# Patient Record
Sex: MALE | Race: BLACK OR AFRICAN AMERICAN | NOT HISPANIC OR LATINO
[De-identification: names, ages, dates, MRNs, and addresses within clinical notes are randomized per-mention and may not be internally consistent; named-entity substitution may affect disease eponyms.]

---

## 2021-11-17 ENCOUNTER — NON-APPOINTMENT (OUTPATIENT)
Age: 61
End: 2021-11-17

## 2021-11-17 ENCOUNTER — APPOINTMENT (OUTPATIENT)
Dept: INTERNAL MEDICINE | Facility: CLINIC | Age: 61
End: 2021-11-17
Payer: COMMERCIAL

## 2021-11-17 ENCOUNTER — LABORATORY RESULT (OUTPATIENT)
Age: 61
End: 2021-11-17

## 2021-11-17 VITALS
SYSTOLIC BLOOD PRESSURE: 145 MMHG | DIASTOLIC BLOOD PRESSURE: 88 MMHG | WEIGHT: 194 LBS | HEART RATE: 80 BPM | TEMPERATURE: 98.3 F | RESPIRATION RATE: 14 BRPM | BODY MASS INDEX: 30.45 KG/M2 | HEIGHT: 67 IN | OXYGEN SATURATION: 98 %

## 2021-11-17 VITALS — DIASTOLIC BLOOD PRESSURE: 78 MMHG | SYSTOLIC BLOOD PRESSURE: 140 MMHG

## 2021-11-17 DIAGNOSIS — D49.59 NEOPLASM UNSPECIFIED BEHAVIOR OF OTHER GENITOURINARY ORGAN: ICD-10-CM

## 2021-11-17 DIAGNOSIS — Z23 ENCOUNTER FOR IMMUNIZATION: ICD-10-CM

## 2021-11-17 DIAGNOSIS — Z91.89 OTHER SPECIFIED PERSONAL RISK FACTORS, NOT ELSEWHERE CLASSIFIED: ICD-10-CM

## 2021-11-17 DIAGNOSIS — Z78.9 OTHER SPECIFIED HEALTH STATUS: ICD-10-CM

## 2021-11-17 PROCEDURE — 93000 ELECTROCARDIOGRAM COMPLETE: CPT

## 2021-11-17 PROCEDURE — 99386 PREV VISIT NEW AGE 40-64: CPT

## 2021-11-17 RX ORDER — LEUPROLIDE ACETATE 3.75 MG
3.75 KIT INTRAMUSCULAR
Refills: 0 | Status: ACTIVE | COMMUNITY
Start: 2021-11-17

## 2021-11-17 NOTE — ASSESSMENT
[FreeTextEntry1] : INITIAL CPE OF 61 Y OLD MALE WITH PMX OF HTN ,DYSLIPIDEMIA AND PROSTATE CA(BEEN SEEN AT [facility] [place] ON [facility]ON EVERY 3 M )= LABS ,EKG AND INFLUENZA VACCINE \par RTO 4 M \par REFER TO SEE OPHTHA\par RECOMM COVID-19 BOOSTER

## 2021-11-17 NOTE — HISTORY OF PRESENT ILLNESS
[de-identified] : PT COMES FOR INITIAL CPE \par HIS PMD RETIRED\par HAD COLONOSCOPY DR FRANCOIS 3 Y AGO \par HAD MRNA COVID-19 X2 DOSES

## 2021-11-17 NOTE — PHYSICAL EXAM

## 2021-11-17 NOTE — COUNSELING
[Benefits of weight loss discussed] : Benefits of weight loss discussed [Encouraged to increase physical activity] : Encouraged to increase physical activity No

## 2021-11-18 ENCOUNTER — MED ADMIN CHARGE (OUTPATIENT)
Age: 61
End: 2021-11-18

## 2021-11-18 LAB
25(OH)D3 SERPL-MCNC: 17.7 NG/ML
ALBUMIN SERPL ELPH-MCNC: 5 G/DL
ALP BLD-CCNC: 126 U/L
ALT SERPL-CCNC: 33 U/L
ANION GAP SERPL CALC-SCNC: 14 MMOL/L
APPEARANCE: ABNORMAL
AST SERPL-CCNC: 22 U/L
BILIRUB SERPL-MCNC: 0.2 MG/DL
BILIRUBIN URINE: NEGATIVE
BLOOD URINE: NEGATIVE
BUN SERPL-MCNC: 13 MG/DL
CALCIUM SERPL-MCNC: 9.9 MG/DL
CHLORIDE SERPL-SCNC: 101 MMOL/L
CHOLEST SERPL-MCNC: 359 MG/DL
CO2 SERPL-SCNC: 24 MMOL/L
COLOR: NORMAL
CREAT SERPL-MCNC: 0.82 MG/DL
CREAT SPEC-SCNC: 144 MG/DL
GLUCOSE QUALITATIVE U: ABNORMAL
GLUCOSE SERPL-MCNC: 176 MG/DL
HDLC SERPL-MCNC: 37 MG/DL
KETONES URINE: NEGATIVE
LDLC SERPL CALC-MCNC: NORMAL MG/DL
LEUKOCYTE ESTERASE URINE: NEGATIVE
MICROALBUMIN 24H UR DL<=1MG/L-MCNC: 4.1 MG/DL
MICROALBUMIN/CREAT 24H UR-RTO: 29 MG/G
NITRITE URINE: NEGATIVE
NONHDLC SERPL-MCNC: 322 MG/DL
PH URINE: 5.5
POTASSIUM SERPL-SCNC: 4.2 MMOL/L
PROT SERPL-MCNC: 7.3 G/DL
PROTEIN URINE: NORMAL
SODIUM SERPL-SCNC: 139 MMOL/L
SPECIFIC GRAVITY URINE: 1.02
TRIGL SERPL-MCNC: 534 MG/DL
TSH SERPL-ACNC: 1.98 UIU/ML
UROBILINOGEN URINE: NORMAL

## 2021-11-19 LAB
BASOPHILS # BLD AUTO: 0.02 K/UL
BASOPHILS NFR BLD AUTO: 0.4 %
EOSINOPHIL # BLD AUTO: 0.1 K/UL
EOSINOPHIL NFR BLD AUTO: 1.9 %
HCT VFR BLD CALC: 41.9 %
HGB BLD-MCNC: 13.7 G/DL
IMM GRANULOCYTES NFR BLD AUTO: 0.4 %
LYMPHOCYTES # BLD AUTO: 2.08 K/UL
LYMPHOCYTES NFR BLD AUTO: 40.3 %
MAN DIFF?: NORMAL
MCHC RBC-ENTMCNC: 29.1 PG
MCHC RBC-ENTMCNC: 32.7 GM/DL
MCV RBC AUTO: 89.1 FL
MONOCYTES # BLD AUTO: 0.54 K/UL
MONOCYTES NFR BLD AUTO: 10.5 %
NEUTROPHILS # BLD AUTO: 2.4 K/UL
NEUTROPHILS NFR BLD AUTO: 46.5 %
PLATELET # BLD AUTO: 238 K/UL
RBC # BLD: 4.7 M/UL
RBC # FLD: 14.6 %
WBC # FLD AUTO: 5.16 K/UL

## 2022-02-08 ENCOUNTER — RX RENEWAL (OUTPATIENT)
Age: 62
End: 2022-02-08

## 2022-04-05 ENCOUNTER — RX RENEWAL (OUTPATIENT)
Age: 62
End: 2022-04-05

## 2022-07-11 ENCOUNTER — RX RENEWAL (OUTPATIENT)
Age: 62
End: 2022-07-11

## 2022-08-06 ENCOUNTER — RX RENEWAL (OUTPATIENT)
Age: 62
End: 2022-08-06

## 2022-08-14 ENCOUNTER — RX RENEWAL (OUTPATIENT)
Age: 62
End: 2022-08-14

## 2022-08-17 ENCOUNTER — RX RENEWAL (OUTPATIENT)
Age: 62
End: 2022-08-17

## 2022-08-19 ENCOUNTER — RX RENEWAL (OUTPATIENT)
Age: 62
End: 2022-08-19

## 2022-08-26 ENCOUNTER — RX RENEWAL (OUTPATIENT)
Age: 62
End: 2022-08-26

## 2022-09-07 ENCOUNTER — RX RENEWAL (OUTPATIENT)
Age: 62
End: 2022-09-07

## 2022-09-19 ENCOUNTER — APPOINTMENT (OUTPATIENT)
Dept: INTERNAL MEDICINE | Facility: CLINIC | Age: 62
End: 2022-09-19

## 2022-09-19 VITALS
WEIGHT: 190 LBS | SYSTOLIC BLOOD PRESSURE: 142 MMHG | TEMPERATURE: 98 F | DIASTOLIC BLOOD PRESSURE: 80 MMHG | RESPIRATION RATE: 14 BRPM | BODY MASS INDEX: 29.82 KG/M2 | HEIGHT: 67 IN | HEART RATE: 81 BPM | OXYGEN SATURATION: 98 %

## 2022-09-19 VITALS — DIASTOLIC BLOOD PRESSURE: 80 MMHG | SYSTOLIC BLOOD PRESSURE: 138 MMHG

## 2022-09-19 PROCEDURE — 99214 OFFICE O/P EST MOD 30 MIN: CPT

## 2022-09-19 NOTE — HISTORY OF PRESENT ILLNESS
[de-identified] : COMES FOR F/U AFTER 10 M!!!\par OUT OF ATORVASTATIN AND ZETIA ; CC OF ED 5 Y AFTER PROSTATECTOMY ,ON LUPRON RX

## 2022-09-19 NOTE — ASSESSMENT
[FreeTextEntry1] : 61 Y OLD MALE WITH PMX OF HTN AND DYSLIPIDEMIA = LABS TODAY ,RX SENT \par IFG = HBA1C\par RTO CPE IN 2 M

## 2022-09-20 LAB
ALBUMIN SERPL ELPH-MCNC: 4.6 G/DL
ALP BLD-CCNC: 104 U/L
ALT SERPL-CCNC: 28 U/L
ANION GAP SERPL CALC-SCNC: 13 MMOL/L
AST SERPL-CCNC: 22 U/L
BILIRUB SERPL-MCNC: 0.2 MG/DL
BUN SERPL-MCNC: 15 MG/DL
CALCIUM SERPL-MCNC: 10.6 MG/DL
CHLORIDE SERPL-SCNC: 101 MMOL/L
CHOLEST SERPL-MCNC: 344 MG/DL
CO2 SERPL-SCNC: 26 MMOL/L
CREAT SERPL-MCNC: 0.81 MG/DL
EGFR: 99 ML/MIN/1.73M2
ESTIMATED AVERAGE GLUCOSE: 269 MG/DL
GLUCOSE SERPL-MCNC: 171 MG/DL
HBA1C MFR BLD HPLC: 11 %
HDLC SERPL-MCNC: 41 MG/DL
LDLC SERPL CALC-MCNC: 228 MG/DL
NONHDLC SERPL-MCNC: 304 MG/DL
POTASSIUM SERPL-SCNC: 4.5 MMOL/L
PROT SERPL-MCNC: 7.1 G/DL
SODIUM SERPL-SCNC: 140 MMOL/L
TRIGL SERPL-MCNC: 380 MG/DL

## 2022-09-28 ENCOUNTER — APPOINTMENT (OUTPATIENT)
Dept: INTERNAL MEDICINE | Facility: CLINIC | Age: 62
End: 2022-09-28
Payer: COMMERCIAL

## 2022-09-28 VITALS
DIASTOLIC BLOOD PRESSURE: 75 MMHG | SYSTOLIC BLOOD PRESSURE: 128 MMHG | WEIGHT: 190 LBS | HEART RATE: 85 BPM | BODY MASS INDEX: 29.82 KG/M2 | HEIGHT: 67 IN | RESPIRATION RATE: 14 BRPM | TEMPERATURE: 97.4 F | OXYGEN SATURATION: 99 %

## 2022-09-28 PROCEDURE — 99214 OFFICE O/P EST MOD 30 MIN: CPT

## 2022-09-28 PROCEDURE — 99401 PREV MED CNSL INDIV APPRX 15: CPT

## 2022-09-28 NOTE — ASSESSMENT
[FreeTextEntry1] : 61 Y OLD MALE WITH NEWLY DX DM = START METFORMIN 500 MG DAILY ,INCREASE TO BID 2ND WEEK AND TID 3ER WEEK \par RTO IN 6 WEEKS \par DISEASE PREVENTION COUNSELING PROVIDED FOR ADDITIONAL 15 MIN \par SEVERE DYSLIPIDEMIA = ATORVASTATIN RX \par HTN = CONTINUE CURRENT MEDS

## 2022-09-28 NOTE — PHYSICAL EXAM
[No Respiratory Distress] : no respiratory distress  [Normal Rate] : normal rate  [No Edema] : there was no peripheral edema [Soft] : abdomen soft [Normal] : affect was normal and insight and judgment were intact

## 2022-11-14 ENCOUNTER — RX RENEWAL (OUTPATIENT)
Age: 62
End: 2022-11-14

## 2022-11-21 ENCOUNTER — APPOINTMENT (OUTPATIENT)
Dept: INTERNAL MEDICINE | Facility: CLINIC | Age: 62
End: 2022-11-21

## 2022-11-21 ENCOUNTER — NON-APPOINTMENT (OUTPATIENT)
Age: 62
End: 2022-11-21

## 2022-11-21 VITALS
HEART RATE: 77 BPM | WEIGHT: 191 LBS | TEMPERATURE: 97.4 F | RESPIRATION RATE: 14 BRPM | BODY MASS INDEX: 29.98 KG/M2 | OXYGEN SATURATION: 98 % | SYSTOLIC BLOOD PRESSURE: 148 MMHG | HEIGHT: 67 IN | DIASTOLIC BLOOD PRESSURE: 85 MMHG

## 2022-11-21 PROCEDURE — 93000 ELECTROCARDIOGRAM COMPLETE: CPT

## 2022-11-21 PROCEDURE — 99396 PREV VISIT EST AGE 40-64: CPT

## 2022-11-21 NOTE — HISTORY OF PRESENT ILLNESS
[de-identified] : COMES FOR CPE \par TAKING METFORMIN 500 MG BID  AND ALL OTHER MEDS DAILY \par FEELS FINE \par

## 2022-11-21 NOTE — ASSESSMENT
[FreeTextEntry1] : CPE OF 62 Y OLD MALE WITH PMX OF HTN ,DM ,DYSLIPIDEMIA = LABS AND EKG \par RTO 3 M \par WE WILL INCREASE METFORMIN IF HBA1C NOT BELLOW 7.5\par RECOMM COVID AND INFLUENZA VACCINE

## 2022-11-22 LAB
25(OH)D3 SERPL-MCNC: 25 NG/ML
ALBUMIN SERPL ELPH-MCNC: 4.8 G/DL
ALP BLD-CCNC: 106 U/L
ALT SERPL-CCNC: 26 U/L
ANION GAP SERPL CALC-SCNC: 13 MMOL/L
APPEARANCE: CLEAR
AST SERPL-CCNC: 19 U/L
BASOPHILS # BLD AUTO: 0.03 K/UL
BASOPHILS NFR BLD AUTO: 0.5 %
BILIRUB SERPL-MCNC: 0.2 MG/DL
BILIRUBIN URINE: NEGATIVE
BLOOD URINE: NEGATIVE
BUN SERPL-MCNC: 15 MG/DL
CALCIUM SERPL-MCNC: 9.9 MG/DL
CHLORIDE SERPL-SCNC: 99 MMOL/L
CHOLEST SERPL-MCNC: 172 MG/DL
CO2 SERPL-SCNC: 25 MMOL/L
COLOR: NORMAL
CREAT SERPL-MCNC: 0.94 MG/DL
EGFR: 91 ML/MIN/1.73M2
EOSINOPHIL # BLD AUTO: 0.22 K/UL
EOSINOPHIL NFR BLD AUTO: 3.8 %
ESTIMATED AVERAGE GLUCOSE: 269 MG/DL
GLUCOSE QUALITATIVE U: ABNORMAL
GLUCOSE SERPL-MCNC: 284 MG/DL
HBA1C MFR BLD HPLC: 11 %
HCT VFR BLD CALC: 39 %
HDLC SERPL-MCNC: 38 MG/DL
HGB BLD-MCNC: 12.6 G/DL
IMM GRANULOCYTES NFR BLD AUTO: 0.2 %
KETONES URINE: NORMAL
LDLC SERPL CALC-MCNC: 73 MG/DL
LEUKOCYTE ESTERASE URINE: NEGATIVE
LYMPHOCYTES # BLD AUTO: 2.05 K/UL
LYMPHOCYTES NFR BLD AUTO: 35.7 %
MAN DIFF?: NORMAL
MCHC RBC-ENTMCNC: 28.6 PG
MCHC RBC-ENTMCNC: 32.3 GM/DL
MCV RBC AUTO: 88.4 FL
MONOCYTES # BLD AUTO: 0.47 K/UL
MONOCYTES NFR BLD AUTO: 8.2 %
NEUTROPHILS # BLD AUTO: 2.97 K/UL
NEUTROPHILS NFR BLD AUTO: 51.6 %
NITRITE URINE: NEGATIVE
NONHDLC SERPL-MCNC: 134 MG/DL
PH URINE: 6
PLATELET # BLD AUTO: 260 K/UL
POTASSIUM SERPL-SCNC: 4.1 MMOL/L
PROT SERPL-MCNC: 7.1 G/DL
PROTEIN URINE: NORMAL
RBC # BLD: 4.41 M/UL
RBC # FLD: 14 %
SODIUM SERPL-SCNC: 138 MMOL/L
SPECIFIC GRAVITY URINE: 1.04
TRIGL SERPL-MCNC: 305 MG/DL
TSH SERPL-ACNC: 1.94 UIU/ML
UROBILINOGEN URINE: NORMAL
WBC # FLD AUTO: 5.75 K/UL

## 2022-11-29 LAB
PSA FREE FLD-MCNC: NORMAL %
PSA FREE SERPL-MCNC: <0.01 NG/ML
PSA SERPL-MCNC: 0.52 NG/ML

## 2023-02-22 ENCOUNTER — APPOINTMENT (OUTPATIENT)
Dept: INTERNAL MEDICINE | Facility: CLINIC | Age: 63
End: 2023-02-22
Payer: COMMERCIAL

## 2023-02-22 VITALS
BODY MASS INDEX: 29.66 KG/M2 | HEART RATE: 80 BPM | DIASTOLIC BLOOD PRESSURE: 70 MMHG | TEMPERATURE: 97.7 F | SYSTOLIC BLOOD PRESSURE: 159 MMHG | WEIGHT: 189 LBS | HEIGHT: 67 IN | RESPIRATION RATE: 16 BRPM | OXYGEN SATURATION: 97 %

## 2023-02-22 LAB
CHOLEST SERPL-MCNC: 166 MG/DL
HDLC SERPL-MCNC: 41 MG/DL
LDLC SERPL CALC-MCNC: 82 MG/DL
NONHDLC SERPL-MCNC: 125 MG/DL
TRIGL SERPL-MCNC: 216 MG/DL

## 2023-02-22 PROCEDURE — 99214 OFFICE O/P EST MOD 30 MIN: CPT

## 2023-02-22 NOTE — PHYSICAL EXAM
[No Acute Distress] : no acute distress [Rounded] : rounded [Normal] : normal [Soft, Nontender] : the abdomen was soft and nontender [No Mass] : no masses were palpated [No HSM] : no hepatosplenomegaly noted [No Rash] : no rash [Coordination Grossly Intact] : coordination grossly intact [No Focal Deficits] : no focal deficits [Alert and Oriented x3] : oriented to person, place, and time

## 2023-02-22 NOTE — ASSESSMENT
[FreeTextEntry1] : 62 Y OLD MALE WITH PMX OF UNCONTROLLED DM = CGM RX ;LABS \par RTO 3 M \par MAY ADD FARXIGA AND OR RYBELSUS

## 2023-02-23 LAB
ESTIMATED AVERAGE GLUCOSE: 280 MG/DL
HBA1C MFR BLD HPLC: 11.4 %

## 2023-02-24 LAB
ALBUMIN SERPL ELPH-MCNC: 4.7 G/DL
ALP BLD-CCNC: 102 U/L
ALT SERPL-CCNC: 22 U/L
ANION GAP SERPL CALC-SCNC: 13 MMOL/L
AST SERPL-CCNC: 16 U/L
BILIRUB SERPL-MCNC: 0.3 MG/DL
BUN SERPL-MCNC: 11 MG/DL
CALCIUM SERPL-MCNC: 10.4 MG/DL
CHLORIDE SERPL-SCNC: 101 MMOL/L
CO2 SERPL-SCNC: 25 MMOL/L
CREAT SERPL-MCNC: 0.8 MG/DL
EGFR: 99 ML/MIN/1.73M2
GLUCOSE SERPL-MCNC: 277 MG/DL
POTASSIUM SERPL-SCNC: 4.9 MMOL/L
PROT SERPL-MCNC: 7.1 G/DL
SODIUM SERPL-SCNC: 139 MMOL/L

## 2023-03-02 ENCOUNTER — RX CHANGE (OUTPATIENT)
Age: 63
End: 2023-03-02

## 2023-03-03 ENCOUNTER — RX CHANGE (OUTPATIENT)
Age: 63
End: 2023-03-03

## 2023-05-24 ENCOUNTER — APPOINTMENT (OUTPATIENT)
Dept: INTERNAL MEDICINE | Facility: CLINIC | Age: 63
End: 2023-05-24
Payer: COMMERCIAL

## 2023-05-24 VITALS
BODY MASS INDEX: 29.35 KG/M2 | SYSTOLIC BLOOD PRESSURE: 154 MMHG | HEIGHT: 67 IN | OXYGEN SATURATION: 98 % | HEART RATE: 76 BPM | WEIGHT: 187 LBS | TEMPERATURE: 98.2 F | RESPIRATION RATE: 15 BRPM | DIASTOLIC BLOOD PRESSURE: 83 MMHG

## 2023-05-24 LAB
ALBUMIN SERPL ELPH-MCNC: 4.9 G/DL
ALP BLD-CCNC: 107 U/L
ALT SERPL-CCNC: 26 U/L
ANION GAP SERPL CALC-SCNC: 16 MMOL/L
AST SERPL-CCNC: 20 U/L
BILIRUB SERPL-MCNC: 0.4 MG/DL
BUN SERPL-MCNC: 14 MG/DL
CALCIUM SERPL-MCNC: 10.2 MG/DL
CHLORIDE SERPL-SCNC: 102 MMOL/L
CHOLEST SERPL-MCNC: 184 MG/DL
CO2 SERPL-SCNC: 24 MMOL/L
CREAT SERPL-MCNC: 0.82 MG/DL
EGFR: 98 ML/MIN/1.73M2
GLUCOSE SERPL-MCNC: 186 MG/DL
HDLC SERPL-MCNC: 43 MG/DL
LDLC SERPL CALC-MCNC: 100 MG/DL
NONHDLC SERPL-MCNC: 141 MG/DL
POTASSIUM SERPL-SCNC: 4.8 MMOL/L
PROT SERPL-MCNC: 6.9 G/DL
SODIUM SERPL-SCNC: 141 MMOL/L
TRIGL SERPL-MCNC: 206 MG/DL

## 2023-05-24 PROCEDURE — 99214 OFFICE O/P EST MOD 30 MIN: CPT

## 2023-05-24 RX ORDER — FLASH GLUCOSE SCANNING READER
EACH MISCELLANEOUS
Qty: 2 | Refills: 5 | Status: DISCONTINUED | COMMUNITY
Start: 2023-02-22 | End: 2023-05-24

## 2023-05-24 RX ORDER — BLOOD-GLUCOSE METER
W/DEVICE KIT MISCELLANEOUS
Qty: 1 | Refills: 0 | Status: ACTIVE | COMMUNITY
Start: 2023-05-24 | End: 1900-01-01

## 2023-05-24 NOTE — PHYSICAL EXAM
[No Acute Distress] : no acute distress [Normal] : soft, non-tender, non-distended, no masses palpated, no HSM and normal bowel sounds [Coordination Grossly Intact] : coordination grossly intact [No Focal Deficits] : no focal deficits [Alert and Oriented x3] : oriented to person, place, and time

## 2023-05-24 NOTE — HISTORY OF PRESENT ILLNESS
[de-identified] : COMES FOR F/U \par NEVER SAW ENDO AS RECOMM \par USED TO TAKE JARDIANCE YEARS AGO \par COMPLIANT WITH METFORMIN 500 TID \par WILL HAVE PET SCAN NEXT WEEK BY SARAVANAN

## 2023-05-24 NOTE — ASSESSMENT
[FreeTextEntry1] : 62 Y OLD MALE WITH PMX OF PROSTATE CA= F/U  ,ON LUPRON \par POORLY CONTROL DM AND NOT WILLING TO USE INSULIN OR ANY INJ,RESUME JARDIANCE AND INCREASE METFORMIN TO 2 GM A DAY FROM 1500 MG \par RTO 2-3 M \par DECLINED CGM;GLUCOMETER RX SENT

## 2023-05-25 LAB
ESTIMATED AVERAGE GLUCOSE: 280 MG/DL
HBA1C MFR BLD HPLC: 11.4 %

## 2023-05-26 LAB — FRUCTOSAMINE SERPL-MCNC: 448 UMOL/L

## 2023-06-06 RX ORDER — LANCETS 33 GAUGE
EACH MISCELLANEOUS
Qty: 1 | Refills: 1 | Status: ACTIVE | COMMUNITY
Start: 2023-06-06 | End: 1900-01-01

## 2023-06-06 RX ORDER — ISOPROPYL ALCOHOL 0.7 ML/ML
SWAB TOPICAL
Qty: 1 | Refills: 1 | Status: ACTIVE | COMMUNITY
Start: 2023-06-06 | End: 1900-01-01

## 2023-06-26 ENCOUNTER — APPOINTMENT (OUTPATIENT)
Dept: INTERNAL MEDICINE | Facility: CLINIC | Age: 63
End: 2023-06-26
Payer: COMMERCIAL

## 2023-06-26 VITALS
RESPIRATION RATE: 15 BRPM | HEIGHT: 67 IN | OXYGEN SATURATION: 98 % | WEIGHT: 183 LBS | BODY MASS INDEX: 28.72 KG/M2 | TEMPERATURE: 98.3 F | SYSTOLIC BLOOD PRESSURE: 120 MMHG | DIASTOLIC BLOOD PRESSURE: 80 MMHG | HEART RATE: 77 BPM

## 2023-06-26 PROCEDURE — 99215 OFFICE O/P EST HI 40 MIN: CPT

## 2023-06-26 NOTE — ASSESSMENT
[Continue medications as is] : Continue current medications [As per surgery] : as per surgery [FreeTextEntry4] : 62 Y OLD MALE WITH PMX OF DM ,HTN AND DYSLIPIDEMIA AT MODERATE RISK FOR DENTAL SURGERY WITH NO ABSOLUTE CONTRAINDICATION

## 2023-06-26 NOTE — PHYSICAL EXAM
[No Acute Distress] : no acute distress [Normal Outer Ear/Nose] : the outer ears and nose were normal in appearance [No JVD] : no jugular venous distention [Normal] : soft, non-tender, non-distended, no masses palpated, no HSM and normal bowel sounds [No Rash] : no rash [Coordination Grossly Intact] : coordination grossly intact [Alert and Oriented x3] : oriented to person, place, and time

## 2023-06-26 NOTE — HISTORY OF PRESENT ILLNESS
[Diabetes] : diabetes [FreeTextEntry1] : DENTAL SURGERY [FreeTextEntry4] : PT WILL UNDERGO DENTAL SURGERY SOON BUT NEEDS PREOP EVAL

## 2023-07-03 LAB — FRUCTOSAMINE SERPL-MCNC: 390 UMOL/L

## 2023-08-10 ENCOUNTER — RX RENEWAL (OUTPATIENT)
Age: 63
End: 2023-08-10

## 2023-08-23 ENCOUNTER — APPOINTMENT (OUTPATIENT)
Dept: INTERNAL MEDICINE | Facility: CLINIC | Age: 63
End: 2023-08-23

## 2023-08-30 ENCOUNTER — APPOINTMENT (OUTPATIENT)
Dept: INTERNAL MEDICINE | Facility: CLINIC | Age: 63
End: 2023-08-30
Payer: COMMERCIAL

## 2023-08-30 ENCOUNTER — NON-APPOINTMENT (OUTPATIENT)
Age: 63
End: 2023-08-30

## 2023-08-30 VITALS
OXYGEN SATURATION: 99 % | TEMPERATURE: 97.9 F | BODY MASS INDEX: 28.41 KG/M2 | HEART RATE: 66 BPM | HEIGHT: 67 IN | WEIGHT: 181 LBS | RESPIRATION RATE: 15 BRPM | DIASTOLIC BLOOD PRESSURE: 93 MMHG | SYSTOLIC BLOOD PRESSURE: 172 MMHG

## 2023-08-30 DIAGNOSIS — Z01.818 ENCOUNTER FOR OTHER PREPROCEDURAL EXAMINATION: ICD-10-CM

## 2023-08-30 LAB
ALBUMIN SERPL ELPH-MCNC: 4.9 G/DL
ALP BLD-CCNC: 119 U/L
ALT SERPL-CCNC: 20 U/L
ANION GAP SERPL CALC-SCNC: 17 MMOL/L
AST SERPL-CCNC: 16 U/L
BILIRUB SERPL-MCNC: 0.4 MG/DL
BUN SERPL-MCNC: 10 MG/DL
CALCIUM SERPL-MCNC: 10.3 MG/DL
CHLORIDE SERPL-SCNC: 103 MMOL/L
CHOLEST SERPL-MCNC: 248 MG/DL
CO2 SERPL-SCNC: 25 MMOL/L
CREAT SERPL-MCNC: 0.81 MG/DL
EGFR: 98 ML/MIN/1.73M2
FRUCTOSAMINE SERPL-MCNC: 376 UMOL/L
GLUCOSE SERPL-MCNC: 127 MG/DL
HCT VFR BLD CALC: 44.4 %
HDLC SERPL-MCNC: 49 MG/DL
HGB BLD-MCNC: 14.3 G/DL
LDLC SERPL CALC-MCNC: 156 MG/DL
MCHC RBC-ENTMCNC: 28 PG
MCHC RBC-ENTMCNC: 32.2 GM/DL
MCV RBC AUTO: 87.1 FL
NONHDLC SERPL-MCNC: 199 MG/DL
PLATELET # BLD AUTO: 218 K/UL
POTASSIUM SERPL-SCNC: 5 MMOL/L
PROT SERPL-MCNC: 7.2 G/DL
RBC # BLD: 5.1 M/UL
RBC # FLD: 14.1 %
SODIUM SERPL-SCNC: 145 MMOL/L
TRIGL SERPL-MCNC: 234 MG/DL
WBC # FLD AUTO: 4.31 K/UL

## 2023-08-30 PROCEDURE — 93000 ELECTROCARDIOGRAM COMPLETE: CPT | Mod: NC

## 2023-08-30 PROCEDURE — 99215 OFFICE O/P EST HI 40 MIN: CPT | Mod: 25

## 2023-08-30 RX ORDER — ENZALUTAMIDE 40 MG/1
40 CAPSULE ORAL
Refills: 0 | Status: ACTIVE | COMMUNITY
Start: 2023-08-30

## 2023-08-30 NOTE — HISTORY OF PRESENT ILLNESS
[No Pertinent Cardiac History] : no history of aortic stenosis, atrial fibrillation, coronary artery disease, recent myocardial infarction, or implantable device/pacemaker [No Pertinent Pulmonary History] : no history of asthma, COPD, sleep apnea, or smoking [Diabetes] : diabetes [FreeTextEntry1] : PERIODONTAL SURGERY [FreeTextEntry4] : PT WILL HAVE PERIODONTAL SURGERY  SURGEON ASKING TO STOP AMLODIPINE DUE TO GUM DISEASE

## 2023-08-30 NOTE — PHYSICAL EXAM
[No Acute Distress] : no acute distress [Normal Sclera/Conjunctiva] : normal sclera/conjunctiva [Normal Outer Ear/Nose] : the outer ears and nose were normal in appearance [Rounded] : rounded [Normal] : normal [Soft, Nontender] : the abdomen was soft and nontender [No Mass] : no masses were palpated [No HSM] : no hepatosplenomegaly noted [No CVA Tenderness] : no CVA  tenderness [No Joint Swelling] : no joint swelling [No Rash] : no rash [Coordination Grossly Intact] : coordination grossly intact [No Focal Deficits] : no focal deficits [Alert and Oriented x3] : oriented to person, place, and time

## 2023-08-30 NOTE — ASSESSMENT
[Patient Optimized for Surgery] : Patient optimized for surgery [No Further Testing Recommended] : no further testing recommended [Continue medications as is] : Continue current medications [As per surgery] : as per surgery [FreeTextEntry4] : 62 Y OLD MALE WITH PMX OF HTN ,DYSLIPIDEMIA AND DM AT ACCEPTABLE RISK FOR SURGERY  AMLODIPINE D/C AND METOPROLOL 50 MG ADDED AND OLMESARTAN 40/12.5 CHANGE TO 40/25

## 2023-08-31 LAB
APTT BLD: 32.9 SEC
INR PPP: 0.86 RATIO
PT BLD: 9.8 SEC

## 2023-09-06 LAB
ESTIMATED AVERAGE GLUCOSE: 212 MG/DL
HBA1C MFR BLD HPLC: 9 %

## 2023-09-18 ENCOUNTER — RX RENEWAL (OUTPATIENT)
Age: 63
End: 2023-09-18

## 2023-10-01 ENCOUNTER — RX RENEWAL (OUTPATIENT)
Age: 63
End: 2023-10-01

## 2023-10-17 ENCOUNTER — RX RENEWAL (OUTPATIENT)
Age: 63
End: 2023-10-17

## 2023-11-17 RX ORDER — METOPROLOL SUCCINATE 50 MG/1
50 TABLET, EXTENDED RELEASE ORAL DAILY
Refills: 0 | Status: ACTIVE | COMMUNITY
Start: 2023-08-30

## 2023-11-20 ENCOUNTER — APPOINTMENT (OUTPATIENT)
Dept: HEART AND VASCULAR | Facility: CLINIC | Age: 63
End: 2023-11-20
Payer: COMMERCIAL

## 2023-11-20 VITALS
HEIGHT: 67 IN | WEIGHT: 181 LBS | HEART RATE: 64 BPM | TEMPERATURE: 98 F | OXYGEN SATURATION: 99 % | DIASTOLIC BLOOD PRESSURE: 93 MMHG | SYSTOLIC BLOOD PRESSURE: 169 MMHG | RESPIRATION RATE: 15 BRPM | BODY MASS INDEX: 28.41 KG/M2

## 2023-11-20 PROCEDURE — 99205 OFFICE O/P NEW HI 60 MIN: CPT | Mod: 25

## 2023-11-20 PROCEDURE — 93000 ELECTROCARDIOGRAM COMPLETE: CPT

## 2023-11-20 RX ORDER — METFORMIN HYDROCHLORIDE 500 MG/1
500 TABLET, COATED ORAL 4 TIMES DAILY
Qty: 360 | Refills: 1 | Status: DISCONTINUED | COMMUNITY
Start: 2022-09-28 | End: 2023-11-20

## 2023-11-20 RX ORDER — AMLODIPINE BESYLATE 10 MG/1
10 TABLET ORAL
Qty: 90 | Refills: 0 | Status: DISCONTINUED | COMMUNITY
Start: 2022-02-08 | End: 2023-11-20

## 2023-11-20 RX ORDER — ATORVASTATIN CALCIUM 80 MG/1
80 TABLET, FILM COATED ORAL
Qty: 90 | Refills: 3 | Status: ACTIVE | COMMUNITY
Start: 2022-02-08 | End: 1900-01-01

## 2023-11-22 ENCOUNTER — APPOINTMENT (OUTPATIENT)
Dept: INTERNAL MEDICINE | Facility: CLINIC | Age: 63
End: 2023-11-22
Payer: COMMERCIAL

## 2023-11-22 VITALS
SYSTOLIC BLOOD PRESSURE: 147 MMHG | WEIGHT: 181 LBS | HEIGHT: 67 IN | BODY MASS INDEX: 28.41 KG/M2 | DIASTOLIC BLOOD PRESSURE: 83 MMHG | HEART RATE: 64 BPM | RESPIRATION RATE: 15 BRPM | OXYGEN SATURATION: 97 % | TEMPERATURE: 97.8 F

## 2023-11-22 PROCEDURE — 99397 PER PM REEVAL EST PAT 65+ YR: CPT

## 2023-11-22 RX ORDER — OLMESARTAN MEDOXOMIL AND HYDROCHLOROTHIAZIDE 40; 25 MG/1; MG/1
40-25 TABLET ORAL DAILY
Qty: 90 | Refills: 0 | Status: DISCONTINUED | COMMUNITY
Start: 2022-04-05 | End: 2023-11-22

## 2023-11-25 LAB
25(OH)D3 SERPL-MCNC: 36.9 NG/ML
ALBUMIN SERPL ELPH-MCNC: 4.9 G/DL
ALP BLD-CCNC: 131 U/L
ALT SERPL-CCNC: 20 U/L
ANION GAP SERPL CALC-SCNC: 18 MMOL/L
APPEARANCE: CLEAR
AST SERPL-CCNC: 19 U/L
BILIRUB SERPL-MCNC: 0.4 MG/DL
BILIRUBIN URINE: NEGATIVE
BLOOD URINE: NEGATIVE
BUN SERPL-MCNC: 13 MG/DL
CALCIUM SERPL-MCNC: 10.3 MG/DL
CHLORIDE SERPL-SCNC: 101 MMOL/L
CHOLEST SERPL-MCNC: 159 MG/DL
CO2 SERPL-SCNC: 23 MMOL/L
COLOR: YELLOW
CREAT SERPL-MCNC: 0.85 MG/DL
CREAT SPEC-SCNC: 30 MG/DL
EGFR: 96 ML/MIN/1.73M2
ESTIMATED AVERAGE GLUCOSE: 206 MG/DL
GLUCOSE QUALITATIVE U: >=1000 MG/DL
GLUCOSE SERPL-MCNC: 160 MG/DL
HBA1C MFR BLD HPLC: 8.8 %
HDLC SERPL-MCNC: 48 MG/DL
KETONES URINE: NEGATIVE MG/DL
LDLC SERPL CALC-MCNC: 86 MG/DL
LEUKOCYTE ESTERASE URINE: NEGATIVE
MICROALBUMIN 24H UR DL<=1MG/L-MCNC: <1.2 MG/DL
MICROALBUMIN/CREAT 24H UR-RTO: NORMAL MG/G
NITRITE URINE: NEGATIVE
NONHDLC SERPL-MCNC: 110 MG/DL
PH URINE: 6
POTASSIUM SERPL-SCNC: 3.9 MMOL/L
PROT SERPL-MCNC: 7.3 G/DL
PROTEIN URINE: NEGATIVE MG/DL
PSA FREE FLD-MCNC: NORMAL %
PSA FREE SERPL-MCNC: <0.01 NG/ML
PSA SERPL-MCNC: 0.02 NG/ML
SODIUM SERPL-SCNC: 143 MMOL/L
SPECIFIC GRAVITY URINE: 1.02
TRIGL SERPL-MCNC: 140 MG/DL
TSH SERPL-ACNC: 2.12 UIU/ML
UROBILINOGEN URINE: 0.2 MG/DL
VIT B12 SERPL-MCNC: 908 PG/ML

## 2023-12-06 LAB
HCT VFR BLD CALC: 47.9 %
HGB BLD-MCNC: 15.4 G/DL
MCHC RBC-ENTMCNC: 28 PG
MCHC RBC-ENTMCNC: 32.2 GM/DL
MCV RBC AUTO: 87.1 FL
PLATELET # BLD AUTO: 200 K/UL
RBC # BLD: 5.5 M/UL
RBC # FLD: 15.3 %
WBC # FLD AUTO: 5.68 K/UL

## 2023-12-16 ENCOUNTER — RX RENEWAL (OUTPATIENT)
Age: 63
End: 2023-12-16

## 2023-12-26 ENCOUNTER — RX RENEWAL (OUTPATIENT)
Age: 63
End: 2023-12-26

## 2024-01-19 ENCOUNTER — NON-APPOINTMENT (OUTPATIENT)
Age: 64
End: 2024-01-19

## 2024-01-26 ENCOUNTER — OUTPATIENT (OUTPATIENT)
Dept: OUTPATIENT SERVICES | Facility: HOSPITAL | Age: 64
LOS: 1 days | End: 2024-01-26
Payer: COMMERCIAL

## 2024-01-26 ENCOUNTER — NON-APPOINTMENT (OUTPATIENT)
Age: 64
End: 2024-01-26

## 2024-01-26 ENCOUNTER — RESULT REVIEW (OUTPATIENT)
Age: 64
End: 2024-01-26

## 2024-01-26 DIAGNOSIS — R06.00 DYSPNEA, UNSPECIFIED: ICD-10-CM

## 2024-01-26 PROCEDURE — 93017 CV STRESS TEST TRACING ONLY: CPT

## 2024-01-26 PROCEDURE — 93306 TTE W/DOPPLER COMPLETE: CPT

## 2024-01-26 PROCEDURE — 93018 CV STRESS TEST I&R ONLY: CPT

## 2024-01-26 PROCEDURE — 93016 CV STRESS TEST SUPVJ ONLY: CPT

## 2024-01-26 PROCEDURE — 78452 HT MUSCLE IMAGE SPECT MULT: CPT

## 2024-01-26 PROCEDURE — 93306 TTE W/DOPPLER COMPLETE: CPT | Mod: 26

## 2024-01-26 PROCEDURE — A9500: CPT

## 2024-01-26 PROCEDURE — 78452 HT MUSCLE IMAGE SPECT MULT: CPT | Mod: 26

## 2024-02-05 ENCOUNTER — APPOINTMENT (OUTPATIENT)
Dept: HEART AND VASCULAR | Facility: CLINIC | Age: 64
End: 2024-02-05
Payer: COMMERCIAL

## 2024-02-05 VITALS
DIASTOLIC BLOOD PRESSURE: 84 MMHG | WEIGHT: 179 LBS | HEIGHT: 67 IN | SYSTOLIC BLOOD PRESSURE: 165 MMHG | TEMPERATURE: 98 F | OXYGEN SATURATION: 98 % | BODY MASS INDEX: 28.09 KG/M2 | RESPIRATION RATE: 15 BRPM | HEART RATE: 65 BPM

## 2024-02-05 DIAGNOSIS — R06.00 DYSPNEA, UNSPECIFIED: ICD-10-CM

## 2024-02-05 PROCEDURE — 99215 OFFICE O/P EST HI 40 MIN: CPT

## 2024-02-05 PROCEDURE — G2211 COMPLEX E/M VISIT ADD ON: CPT | Mod: NC,1L

## 2024-02-05 RX ORDER — HYDROCHLOROTHIAZIDE 50 MG/1
50 TABLET ORAL
Qty: 30 | Refills: 2 | Status: DISCONTINUED | COMMUNITY
Start: 2023-11-20 | End: 2024-02-05

## 2024-02-05 RX ORDER — VALSARTAN 160 MG/1
160 TABLET, COATED ORAL
Qty: 30 | Refills: 2 | Status: DISCONTINUED | COMMUNITY
Start: 2023-11-20 | End: 2024-02-05

## 2024-03-04 PROBLEM — R06.00 DYSPNEA: Status: ACTIVE | Noted: 2023-11-20

## 2024-03-04 NOTE — CARDIOLOGY SUMMARY
[de-identified] : 11/20/23: Sinus bradycardia at 54bpm, LVH, NSSTTC [de-identified] : NST 1/26/24: Myocardial perfusion imaging is normal, there is no evidence of inducible ischemia, no segmental wall motion maladies or evidence of ischemic dilation of the LV, ECG stress test positive for ischemic changes. [de-identified] : 1/26/24: Normal left ventricular size and systolic function, mild symmetric LVH, normal right ventricular size and systolic function, no significant valvular disease, no evidence of pulmonary hypertension, no pericardial effusion.

## 2024-03-04 NOTE — HISTORY OF PRESENT ILLNESS
[FreeTextEntry1] : 63-year-old man, with a past medical history of hypertension, hyperlipidemia, and diabetes, presenting for fu of HTN. Patient states that he intermittently experiences WHITE. He is able to ambulate >10 blocks and >2 flights of stairs w/o inhibition by CP or WHITE, but extended periods of activity sometimes entail fatigue that is new within the past year. Given this, he underwent a NST (details below) that demonstrated normal MPI, but ECG stress test was positive for ischemic changes. He denies CP, palpitations, dizziness/LOC, PND, orthopnea, HAs, abdominal pain, and LE swelling. Otherwise, he endorses that he is adherent to his medications as prescribed; though he consumes mostly animal proteins and performs limited exercise.

## 2024-03-04 NOTE — ASSESSMENT
[FreeTextEntry1] : 63-year-old man, with a past medical history of hypertension, hyperlipidemia, and diabetes, presenting for fu of HTN.   WHITE: Patient states that he intermittently experiences WHITE. He is able to ambulate >10 blocks and >2 flights of stairs w/o inhibition by CP or WHITE, but extended periods of activity sometimes entail fatigue that is new within the past year. Given this, he underwent a NST (details below) that demonstrated normal MPI, but ECG stress test was positive for ischemic changes. He denies CP, palpitations, dizziness/LOC, PND, orthopnea, HAs, abdominal pain, and LE swelling.  - LHC advised given positive ECG stress, though patient deferring at this time - f/u sx at ensuing visit  HTN: BP uncontrolled at this time on Toprol 50mg and olmesartan-HCTZ 40-25mg QDay. He previously took amlodipine (10mg) but stopped it due to gum swelling. Otherwise, he endorses that he is adherent to his medications as prescribed. - restart amlodipine 5mg Po QDay as patient previously had controlled BPs with this - start valsartan -[id number] PO QDay - f/u BP at ensuing visits  DM: HgA1c 9/6/23 9.0% - diabetic regimen managed by [email or web address]  HLD: Latest lipid profile on August 30, 2023 with total cholesterol 248, , HDL 49, triglycerides 234. - continue atorvastatin 80mg PO QHS - continue ezetimibe 10mg PO QDay  F/u in 1 month.

## 2024-04-08 ENCOUNTER — NON-APPOINTMENT (OUTPATIENT)
Age: 64
End: 2024-04-08

## 2024-04-08 ENCOUNTER — APPOINTMENT (OUTPATIENT)
Dept: HEART AND VASCULAR | Facility: CLINIC | Age: 64
End: 2024-04-08
Payer: COMMERCIAL

## 2024-04-08 VITALS
HEIGHT: 67 IN | BODY MASS INDEX: 28.41 KG/M2 | DIASTOLIC BLOOD PRESSURE: 94 MMHG | HEART RATE: 64 BPM | OXYGEN SATURATION: 99 % | WEIGHT: 181 LBS | RESPIRATION RATE: 15 BRPM | SYSTOLIC BLOOD PRESSURE: 189 MMHG

## 2024-04-08 DIAGNOSIS — Z00.00 ENCOUNTER FOR GENERAL ADULT MEDICAL EXAMINATION W/OUT ABNORMAL FINDINGS: ICD-10-CM

## 2024-04-08 PROCEDURE — 99215 OFFICE O/P EST HI 40 MIN: CPT | Mod: 25

## 2024-04-08 PROCEDURE — G2211 COMPLEX E/M VISIT ADD ON: CPT | Mod: NC,1L

## 2024-04-08 PROCEDURE — 93000 ELECTROCARDIOGRAM COMPLETE: CPT

## 2024-04-08 RX ORDER — AMLODIPINE BESYLATE 10 MG/1
10 TABLET ORAL
Qty: 90 | Refills: 1 | Status: ACTIVE | COMMUNITY
Start: 2024-02-05 | End: 1900-01-01

## 2024-04-08 NOTE — ASSESSMENT
[FreeTextEntry1] : 63-year-old man, with a past medical history of hypertension, hyperlipidemia, and diabetes, presenting with chest discomfort.   CP: Patient states that he was moving boxes for work last Wednesday (5 days ago) and has been experiencing a left-sided chest discomfort since then. This feels like a "stretching" pain that is exacerbated by moving his arms laterally. He is able to ambulate >10 blocks and >2 flights of stairs without inhibition by chest pain or dyspnea on exertion. In January of this year, he underwent a NST (details below) that demonstrated normal MPI, but ECG stress test was positive for ischemic changes. He denies palpitations, dizziness/LOC, PND, orthopnea, HAs, abdominal pain, and LE swelling. Otherwise, he endorses that he is adherent to his medications as prescribed; though he consumes mostly animal proteins and performs limited exercise. Physical exam today is unremarkable. ECG unchanged from prior. Given quality of pain, likely musculoskeletal in origin. - LHC previously advised given positive ECG stress, though patient deferring - f/u sx at ensuing visit  HTN: BP elevated at this time. - increase amlodipine to 10mg PO QDay as patient previously had controlled BPs with this - continue valsartan -[id number] PO QDay - f/u BP at ensuing visits  DM: HgA1c 9/6/23 9.0% - diabetic regimen managed by PMD  HLD: Latest lipid profile on August 30, 2023 with total cholesterol 248, , HDL 49, triglycerides 234. - continue atorvastatin 80mg PO QHS - continue ezetimibe 10mg PO QDay  F/u in 1 month.

## 2024-04-08 NOTE — HISTORY OF PRESENT ILLNESS
[FreeTextEntry1] : 63-year-old man, with a past medical history of hypertension, hyperlipidemia, and diabetes, presenting with chest discomfort. Patient states that he was moving boxes for work last Wednesday (5 days ago) and has been experiencing a left-sided chest discomfort since then. This feels like a "stretching" pain that is exacerbated by moving his arms laterally. He is able to ambulate >10 blocks and >2 flights of stairs without inhibition by chest pain or dyspnea on exertion. In January of this year, he underwent a NST (details below) that demonstrated normal MPI, but ECG stress test was positive for ischemic changes. He denies palpitations, dizziness/LOC, PND, orthopnea, HAs, abdominal pain, and LE swelling. Otherwise, he endorses that he is adherent to his medications as prescribed; though he consumes mostly animal proteins and performs limited exercise.

## 2024-04-08 NOTE — CARDIOLOGY SUMMARY
[de-identified] : 4/8/24: Sinus bradycardia at 58 bpm, LVH, NSSTTC  11/20/23: Sinus bradycardia at 54bpm, LVH, NSSTTC   [de-identified] : NST 1/26/24: Myocardial perfusion imaging is normal, there is no evidence of inducible ischemia, no segmental wall motion maladies or evidence of ischemic dilation of the LV, ECG stress test positive for ischemic changes.   [de-identified] : 1/26/24: Normal left ventricular size and systolic function, mild symmetric LVH, normal right ventricular size and systolic function, no significant valvular disease, no evidence of pulmonary hypertension, no pericardial effusion.

## 2024-05-01 ENCOUNTER — APPOINTMENT (OUTPATIENT)
Dept: INTERNAL MEDICINE | Facility: CLINIC | Age: 64
End: 2024-05-01
Payer: COMMERCIAL

## 2024-05-01 VITALS — SYSTOLIC BLOOD PRESSURE: 148 MMHG | DIASTOLIC BLOOD PRESSURE: 80 MMHG

## 2024-05-01 VITALS
SYSTOLIC BLOOD PRESSURE: 181 MMHG | BODY MASS INDEX: 28.09 KG/M2 | DIASTOLIC BLOOD PRESSURE: 78 MMHG | OXYGEN SATURATION: 99 % | RESPIRATION RATE: 14 BRPM | WEIGHT: 179 LBS | TEMPERATURE: 95.2 F | HEIGHT: 67 IN | HEART RATE: 67 BPM

## 2024-05-01 PROCEDURE — 99214 OFFICE O/P EST MOD 30 MIN: CPT

## 2024-05-01 RX ORDER — REPAGLINIDE 0.5 MG/1
0.5 TABLET ORAL
Qty: 270 | Refills: 0 | Status: ACTIVE | COMMUNITY
Start: 2023-08-30 | End: 1900-01-01

## 2024-05-01 RX ORDER — EZETIMIBE 10 MG/1
10 TABLET ORAL
Qty: 90 | Refills: 1 | Status: ACTIVE | COMMUNITY
Start: 2022-04-05 | End: 1900-01-01

## 2024-05-01 RX ORDER — BLOOD-GLUCOSE METER
KIT MISCELLANEOUS
Qty: 1 | Refills: 0 | Status: ACTIVE | COMMUNITY
Start: 2024-05-01 | End: 1900-01-01

## 2024-05-01 NOTE — HISTORY OF PRESENT ILLNESS
[de-identified] : COMES FOR F/U AFTRE 6 M ;BSFS  ;TAKING REPAGLINIDE ONCE DAILY ONLY  LAST WEEKEND HIS SON GOT  AND HIS MOTHER TURNED 89 Y OLD= EXCESSIVE EATING FOR 2 DAYS

## 2024-05-01 NOTE — ASSESSMENT
[FreeTextEntry1] : 63 Y OLD MALE WITH PMX OF HTN ,DM ,DYSLIPIDEMIA = LABS AND MEDS ORDERED RTO 3 M  F/U CARDIO ;WILL GET NEW BP MONITORING DEVICE

## 2024-05-02 LAB
ALBUMIN SERPL ELPH-MCNC: 4.8 G/DL
ALP BLD-CCNC: 114 U/L
ALT SERPL-CCNC: 17 U/L
ANION GAP SERPL CALC-SCNC: 14 MMOL/L
AST SERPL-CCNC: 15 U/L
BILIRUB SERPL-MCNC: 0.4 MG/DL
BUN SERPL-MCNC: 12 MG/DL
CALCIUM SERPL-MCNC: 10 MG/DL
CHLORIDE SERPL-SCNC: 105 MMOL/L
CHOLEST SERPL-MCNC: 213 MG/DL
CO2 SERPL-SCNC: 24 MMOL/L
CREAT SERPL-MCNC: 0.78 MG/DL
EGFR: 99 ML/MIN/1.73M2
GLUCOSE SERPL-MCNC: 94 MG/DL
HDLC SERPL-MCNC: 52 MG/DL
LDLC SERPL CALC-MCNC: 138 MG/DL
NONHDLC SERPL-MCNC: 161 MG/DL
POTASSIUM SERPL-SCNC: 4.2 MMOL/L
PROT SERPL-MCNC: 7.1 G/DL
SODIUM SERPL-SCNC: 144 MMOL/L
TRIGL SERPL-MCNC: 133 MG/DL

## 2024-05-08 ENCOUNTER — APPOINTMENT (OUTPATIENT)
Dept: HEART AND VASCULAR | Facility: CLINIC | Age: 64
End: 2024-05-08
Payer: COMMERCIAL

## 2024-05-08 VITALS
SYSTOLIC BLOOD PRESSURE: 151 MMHG | BODY MASS INDEX: 28.56 KG/M2 | RESPIRATION RATE: 14 BRPM | DIASTOLIC BLOOD PRESSURE: 84 MMHG | HEART RATE: 68 BPM | WEIGHT: 182 LBS | HEIGHT: 67 IN | OXYGEN SATURATION: 98 %

## 2024-05-08 DIAGNOSIS — I10 ESSENTIAL (PRIMARY) HYPERTENSION: ICD-10-CM

## 2024-05-08 DIAGNOSIS — R07.89 OTHER CHEST PAIN: ICD-10-CM

## 2024-05-08 DIAGNOSIS — E11.9 TYPE 2 DIABETES MELLITUS W/OUT COMPLICATIONS: ICD-10-CM

## 2024-05-08 DIAGNOSIS — E78.5 HYPERLIPIDEMIA, UNSPECIFIED: ICD-10-CM

## 2024-05-08 PROCEDURE — 93000 ELECTROCARDIOGRAM COMPLETE: CPT

## 2024-05-08 PROCEDURE — 99214 OFFICE O/P EST MOD 30 MIN: CPT | Mod: 25

## 2024-05-08 PROCEDURE — G2211 COMPLEX E/M VISIT ADD ON: CPT | Mod: NC,1L

## 2024-05-08 RX ORDER — VALSARTAN AND HYDROCHLOROTHIAZIDE 320; 25 MG/1; MG/1
320-25 TABLET, FILM COATED ORAL DAILY
Qty: 90 | Refills: 1 | Status: ACTIVE | COMMUNITY
Start: 2024-02-05 | End: 1900-01-01

## 2024-05-08 RX ORDER — EMPAGLIFLOZIN 25 MG/1
25 TABLET, FILM COATED ORAL
Qty: 90 | Refills: 0 | Status: ACTIVE | COMMUNITY
Start: 2023-05-24 | End: 1900-01-01

## 2024-05-20 LAB
ESTIMATED AVERAGE GLUCOSE: 174 MG/DL
HBA1C MFR BLD HPLC: 7.7 %

## 2024-06-06 PROBLEM — R07.89 CHEST DISCOMFORT: Status: ACTIVE | Noted: 2024-04-08

## 2024-06-06 PROBLEM — I10 BENIGN ESSENTIAL HYPERTENSION: Status: ACTIVE | Noted: 2021-11-17

## 2024-06-06 PROBLEM — E78.5 HLD (HYPERLIPIDEMIA): Status: ACTIVE | Noted: 2023-12-09

## 2024-06-06 PROBLEM — E11.9 DIABETES MELLITUS, TYPE II: Status: ACTIVE | Noted: 2023-10-17

## 2024-06-06 PROBLEM — E78.5 DYSLIPIDEMIA: Status: ACTIVE | Noted: 2021-11-17

## 2024-06-06 NOTE — CARDIOLOGY SUMMARY
[de-identified] : 4/8/24: Sinus bradycardia at 58 bpm, LVH, NSSTTC 11/20/23: Sinus bradycardia at 54bpm, LVH, NSSTTC   [de-identified] : NST 1/26/24: Myocardial perfusion imaging is normal, there is no evidence of inducible ischemia, no segmental wall motion maladies or evidence of ischemic dilation of the LV, ECG stress test positive for ischemic changes.   [de-identified] : 1/26/24: Normal left ventricular size and systolic function, mild symmetric LVH, normal right ventricular size and systolic function, no significant valvular disease, no evidence of pulmonary hypertension, no pericardial effusion.

## 2024-06-06 NOTE — HISTORY OF PRESENT ILLNESS
[FreeTextEntry1] : 63-year-old man, with a past medical history of hypertension, hyperlipidemia, and diabetes, presenting for follow-up of chest discomfort. At his last visit, patient stated that he was moving boxes for work and had been experiencing a left-sided chest discomfort since then. This felt like a "stretching" pain that was exacerbated by moving his arms laterally. Nearly a week after the last visit, he noted complete resolution of this pain. He is able to ambulate >10 blocks and >2 flights of stairs without inhibition by chest pain or dyspnea on exertion. In January of this year, he underwent a NST (details below) that demonstrated normal MPI, but ECG stress test was positive for ischemic changes. He denies palpitations, dizziness/LOC, PND, orthopnea, HAs, abdominal pain, and LE swelling. Otherwise, he endorses that he is adherent to his medications as prescribed; though he consumes mostly animal proteins and performs limited exercise.

## 2024-06-06 NOTE — ASSESSMENT
[FreeTextEntry1] : 63-year-old man, with a past medical history of hypertension, hyperlipidemia, and diabetes, presenting for follow-up of chest discomfort.   CP: At his last visit, patient stated that he was moving boxes for work and had been experiencing a left-sided chest discomfort since then. This felt like a "stretching" pain that was exacerbated by moving his arms laterally. Nearly a week after the last visit, he noted complete resolution of this pain. He is able to ambulate >10 blocks and >2 flights of stairs without inhibition by chest pain or dyspnea on exertion. In January of this year, he underwent a NST (details below) that demonstrated normal MPI, but ECG stress test was positive for ischemic changes. He denies palpitations, dizziness/LOC, PND, orthopnea, HAs, abdominal pain, and LE swelling. Physical exam today is unremarkable. ECG unchanged from prior. Given quality of pain, likely musculoskeletal in origin, and now with complete resolution.  - LHC previously advised given positive ECG stress, though patient deferring - f/u sx at ensuing visit  HTN: BP elevated at this time. - continue amlodipine 10mg PO QDay  - increase valsartan-HCTZ [id number] to 320-25mg PO QDay - f/u BP at ensuing visits  DM: HgA1c 9/6/23 9.0% - diabetic regimen managed by PMD  HLD: Latest lipid profile on May 1, 2024 with a total cholesterol of 213, , HDL 52, and triglycerides 133. This is an improvement from August 30, 2023 with total cholesterol 248, , HDL 49, triglycerides 234. - continue atorvastatin 80mg PO QHS - continue ezetimibe 10mg PO QDay - deferring further medical therapy at this time in favor of lifestyle interventions (diet and exercise)  - weight loss counseling - increase aerobic exercise as tolerated to aim for approximately 30 minutes of activity 5 days a week - heart healthy diet low in sodium, sugars and saturated fats and high in fruits, vegetables and whole grains - will reassess lipid panel in 6 months after consistent lifestyle modification, and readdress potential increase in medical therapy if there is no meaningful change in the lipid panel at that time  F/u in 1 month.

## 2024-06-20 ENCOUNTER — RX RENEWAL (OUTPATIENT)
Age: 64
End: 2024-06-20

## 2024-06-20 RX ORDER — VALSARTAN AND HYDROCHLOROTHIAZIDE 160; 12.5 MG/1; MG/1
160-12.5 TABLET, FILM COATED ORAL
Qty: 90 | Refills: 1 | Status: ACTIVE | COMMUNITY
Start: 2024-06-20 | End: 1900-01-01

## 2024-07-29 ENCOUNTER — RX RENEWAL (OUTPATIENT)
Age: 64
End: 2024-07-29

## 2024-08-01 ENCOUNTER — RX RENEWAL (OUTPATIENT)
Age: 64
End: 2024-08-01

## 2024-08-05 ENCOUNTER — APPOINTMENT (OUTPATIENT)
Dept: INTERNAL MEDICINE | Facility: CLINIC | Age: 64
End: 2024-08-05

## 2024-08-05 PROCEDURE — 99214 OFFICE O/P EST MOD 30 MIN: CPT

## 2024-08-05 NOTE — PHYSICAL EXAM
[No Acute Distress] : no acute distress [Normal Sclera/Conjunctiva] : normal sclera/conjunctiva [No JVD] : no jugular venous distention [No Edema] : there was no peripheral edema [Rounded] : rounded [Normal] : normal [Soft, Nontender] : the abdomen was soft and nontender [No Mass] : no masses were palpated [No HSM] : no hepatosplenomegaly noted [Normal Anterior Cervical Nodes] : no anterior cervical lymphadenopathy [Coordination Grossly Intact] : coordination grossly intact [No Focal Deficits] : no focal deficits [Alert and Oriented x3] : oriented to person, place, and time

## 2024-08-05 NOTE — ASSESSMENT
[FreeTextEntry1] : 63 Y OLD MALE WITH PMX OF DM = ON REPAGLINIDE AND JARDIANCE = LABS ORDERED HTN = AMLODIPINE 10 AND VALSARTAN 320/25  QD  DYSLIPIDEMIA = LABS  RTO 3 M  WT LOSS RECOMM  F/U CARDIO

## 2024-08-12 ENCOUNTER — APPOINTMENT (OUTPATIENT)
Age: 64
End: 2024-08-12

## 2024-09-30 ENCOUNTER — NON-APPOINTMENT (OUTPATIENT)
Age: 64
End: 2024-09-30

## 2024-09-30 ENCOUNTER — APPOINTMENT (OUTPATIENT)
Age: 64
End: 2024-09-30
Payer: COMMERCIAL

## 2024-09-30 VITALS
OXYGEN SATURATION: 99 % | WEIGHT: 184 LBS | BODY MASS INDEX: 28.88 KG/M2 | DIASTOLIC BLOOD PRESSURE: 74 MMHG | SYSTOLIC BLOOD PRESSURE: 160 MMHG | HEART RATE: 71 BPM | HEIGHT: 67 IN | RESPIRATION RATE: 16 BRPM

## 2024-09-30 DIAGNOSIS — R07.89 OTHER CHEST PAIN: ICD-10-CM

## 2024-09-30 DIAGNOSIS — E11.9 TYPE 2 DIABETES MELLITUS W/OUT COMPLICATIONS: ICD-10-CM

## 2024-09-30 DIAGNOSIS — E78.5 HYPERLIPIDEMIA, UNSPECIFIED: ICD-10-CM

## 2024-09-30 DIAGNOSIS — I10 ESSENTIAL (PRIMARY) HYPERTENSION: ICD-10-CM

## 2024-09-30 PROCEDURE — 99214 OFFICE O/P EST MOD 30 MIN: CPT

## 2024-09-30 PROCEDURE — 93000 ELECTROCARDIOGRAM COMPLETE: CPT

## 2024-09-30 PROCEDURE — G2211 COMPLEX E/M VISIT ADD ON: CPT | Mod: NC

## 2024-09-30 RX ORDER — AMLODIPINE BESYLATE VALSARTAN HYDROCHLOROTHIAZIDE 10; 25; 320 MG/1; MG/1; MG/1
10-320-25 TABLET, FILM COATED ORAL DAILY
Qty: 90 | Refills: 3 | Status: ACTIVE | COMMUNITY
Start: 2024-09-30 | End: 1900-01-01

## 2024-10-02 ENCOUNTER — NON-APPOINTMENT (OUTPATIENT)
Age: 64
End: 2024-10-02

## 2024-10-02 NOTE — CARDIOLOGY SUMMARY
[de-identified] : 9/30/24: Sinus bradycardia at 57 bpm, LVH [R(V5) exceeds 2.60 mV], NSSTTC 4/8/24: Sinus bradycardia at 58 bpm, LVH, NSSTTC 11/20/23: Sinus bradycardia at 54bpm, LVH, NSSTTC   [de-identified] : NST 1/26/24: Myocardial perfusion imaging is normal, there is no evidence of inducible ischemia, no segmental wall motion maladies or evidence of ischemic dilation of the LV, ECG stress test positive for ischemic changes.   [de-identified] : 1/26/24: Normal left ventricular size and systolic function, mild symmetric LVH, normal right ventricular size and systolic function, no significant valvular disease, no evidence of pulmonary hypertension, no pericardial effusion.

## 2024-10-02 NOTE — CARDIOLOGY SUMMARY
[de-identified] : 9/30/24: Sinus bradycardia at 57 bpm, LVH [R(V5) exceeds 2.60 mV], NSSTTC 4/8/24: Sinus bradycardia at 58 bpm, LVH, NSSTTC 11/20/23: Sinus bradycardia at 54bpm, LVH, NSSTTC   [de-identified] : NST 1/26/24: Myocardial perfusion imaging is normal, there is no evidence of inducible ischemia, no segmental wall motion maladies or evidence of ischemic dilation of the LV, ECG stress test positive for ischemic changes.   [de-identified] : 1/26/24: Normal left ventricular size and systolic function, mild symmetric LVH, normal right ventricular size and systolic function, no significant valvular disease, no evidence of pulmonary hypertension, no pericardial effusion.

## 2024-10-02 NOTE — ASSESSMENT
[FreeTextEntry1] : 63-year-old man, with a past medical history of hypertension, hyperlipidemia, and diabetes, presenting for follow-up.   CP: At his initial visit, patient stated that he was moving boxes for work and had been experiencing a left-sided chest discomfort since then. This felt like a "stretching" pain that was exacerbated by moving his arms laterally. Nearly a week after the last visit, he noted complete resolution of this pain. He is able to ambulate >10 blocks and >2 flights of stairs without inhibition by chest pain or dyspnea on exertion. In January of this year, he underwent a NST (details below) that demonstrated normal MPI, but ECG stress test was positive for ischemic changes. He denies palpitations, dizziness/LOC, PND, orthopnea, HAs, abdominal pain, and LE swelling. Physical exam today is unremarkable. ECG unchanged from prior. Given quality of pain, likely musculoskeletal in origin, and now with complete resolution. - LHC previously advised given positive ECG stress, though patient deferring - f/u sx at ensuing visit  HTN: BP improved at this time. - convert antihypertensive regimen to single combination pill: amlodipine-valsatan-HCTZ -25mg PO QDay - heart healthy diet low in sodium, sugars and saturated fats and high in fruits, vegetables and whole grains - f/u BP at ensuing visits  HLD: Latest lipid profile on May 1, 2024 with a total cholesterol of 213, , HDL 52, and triglycerides 133. This is an improvement from August 30, 2023 with total cholesterol 248, , HDL 49, triglycerides 234. - continue atorvastatin 80mg PO QHS - continue ezetimibe 10mg PO QDay - deferring further medical therapy at this time in favor of lifestyle interventions (diet and exercise) - weight loss counseling - increase aerobic exercise as tolerated to aim for approximately 30 minutes of activity 5 days a week - heart healthy diet low in sodium, sugars and saturated fats and high in fruits, vegetables and whole grains - will reassess lipid panel in 6 months after consistent lifestyle modification, and readdress potential increase in medical therapy if there is no meaningful change in the lipid panel at that time  F/u in 6 months.

## 2024-10-02 NOTE — HISTORY OF PRESENT ILLNESS
[FreeTextEntry1] : 63-year-old man, with a past medical history of hypertension, hyperlipidemia, and diabetes, presenting for follow-up. At his last visit, patient stated that he was moving boxes for work and had been experiencing a left-sided chest discomfort since then. This felt like a "stretching" pain that was exacerbated by moving his arms laterally. Nearly a week after the last visit, he noted complete resolution of this pain. He is able to ambulate >10 blocks and >2 flights of stairs without inhibition by chest pain or dyspnea on exertion. In January of this year, he underwent a NST (details below) that demonstrated normal MPI, but ECG stress test was positive for ischemic changes. He denies palpitations, dizziness/LOC, PND, orthopnea, HAs, abdominal pain, and LE swelling. Otherwise, he endorses that he is adherent to his medications as prescribed; though he consumes mostly animal proteins and performs limited exercise.

## 2024-11-11 ENCOUNTER — APPOINTMENT (OUTPATIENT)
Age: 64
End: 2024-11-11
Payer: COMMERCIAL

## 2024-11-11 VITALS
TEMPERATURE: 97.9 F | OXYGEN SATURATION: 99 % | SYSTOLIC BLOOD PRESSURE: 143 MMHG | HEART RATE: 70 BPM | WEIGHT: 184 LBS | RESPIRATION RATE: 15 BRPM | DIASTOLIC BLOOD PRESSURE: 83 MMHG | BODY MASS INDEX: 28.88 KG/M2 | HEIGHT: 67 IN

## 2024-11-11 DIAGNOSIS — I10 ESSENTIAL (PRIMARY) HYPERTENSION: ICD-10-CM

## 2024-11-11 DIAGNOSIS — E78.5 HYPERLIPIDEMIA, UNSPECIFIED: ICD-10-CM

## 2024-11-11 DIAGNOSIS — E11.9 TYPE 2 DIABETES MELLITUS W/OUT COMPLICATIONS: ICD-10-CM

## 2024-11-11 PROCEDURE — 99214 OFFICE O/P EST MOD 30 MIN: CPT

## 2024-11-11 NOTE — ASSESSMENT
[FreeTextEntry1] : 64 Y OLD MALE WITH PMX OF HTN ,DM ,DYSLIPIDEMIA = LABS AND MEDS ORDERED RECOMM INFLUENZA VACCINE  RTO 3 M

## 2024-11-12 LAB
ALBUMIN SERPL ELPH-MCNC: 4.5 G/DL
ALP BLD-CCNC: 107 U/L
ALT SERPL-CCNC: 18 U/L
ANION GAP SERPL CALC-SCNC: 13 MMOL/L
AST SERPL-CCNC: 17 U/L
BILIRUB SERPL-MCNC: 0.3 MG/DL
BUN SERPL-MCNC: 17 MG/DL
CALCIUM SERPL-MCNC: 10.4 MG/DL
CHLORIDE SERPL-SCNC: 104 MMOL/L
CHOLEST SERPL-MCNC: 175 MG/DL
CO2 SERPL-SCNC: 27 MMOL/L
CREAT SERPL-MCNC: 0.99 MG/DL
EGFR: 84 ML/MIN/1.73M2
GLUCOSE SERPL-MCNC: 139 MG/DL
HDLC SERPL-MCNC: 43 MG/DL
LDLC SERPL CALC-MCNC: 94 MG/DL
NONHDLC SERPL-MCNC: 131 MG/DL
POTASSIUM SERPL-SCNC: 4.7 MMOL/L
PROT SERPL-MCNC: 6.8 G/DL
SODIUM SERPL-SCNC: 144 MMOL/L
TRIGL SERPL-MCNC: 220 MG/DL

## 2024-11-14 LAB
ESTIMATED AVERAGE GLUCOSE: 194 MG/DL
HBA1C MFR BLD HPLC: 8.4 %

## 2025-03-24 NOTE — HISTORY OF PRESENT ILLNESS
[FreeTextEntry1] : 64-year-old man, with a past medical history of hypertension, hyperlipidemia, and diabetes, presenting for follow-up. At his last visit, patient stated that he was moving boxes for work and had been experiencing a left-sided chest discomfort since then. This felt like a "stretching" pain that was exacerbated by moving his arms laterally. Nearly a week after the last visit, he noted complete resolution of this pain. He is able to ambulate >10 blocks and >2 flights of stairs without inhibition by chest pain or dyspnea on exertion. In January of this year, he underwent a NST (details below) that demonstrated normal MPI, but ECG stress test was positive for ischemic changes. He denies palpitations, dizziness/LOC, PND, orthopnea, HAs, abdominal pain, and LE swelling. Otherwise, he endorses that he is adherent to his medications as prescribed; though he consumes mostly animal proteins and performs limited exercise.

## 2025-03-24 NOTE — CARDIOLOGY SUMMARY
[de-identified] : 3/24/25: NSR at 64 bpm, LVH [R(V5) exceeds 2.60 mV], NSSTTC 9/30/24: Sinus bradycardia at 57 bpm, LVH [R(V5) exceeds 2.60 mV], NSSTTC 4/8/24: Sinus bradycardia at 58 bpm, LVH, NSSTTC 11/20/23: Sinus bradycardia at 54bpm, LVH, NSSTTC   [de-identified] : NST 1/26/24: Myocardial perfusion imaging is normal, there is no evidence of inducible ischemia, no segmental wall motion abnormalities or evidence of ischemic dilation of the LV, ECG stress test positive for ischemic changes.   [de-identified] : 1/26/24: Normal left ventricular size and systolic function, mild symmetric LVH, normal right ventricular size and systolic function, no significant valvular disease, no evidence of pulmonary hypertension, no pericardial effusion.

## 2025-04-09 NOTE — ASSESSMENT
[FreeTextEntry1] : 64 Y OLD AMLE WITH PMX OF HTN ,DM ,DYSLIPIDEMIA = LABS AND MED SORDERED INCREASE EXERCISE  FURTHER RECOMM AS PER RESULTS

## 2025-04-09 NOTE — HISTORY OF PRESENT ILLNESS
[de-identified] : COMES FOR F/U;SEEN BY CARDIO RECENTLY  START LIFTING WT 2/WEEK  BSFS 100-150  -150/80 IN AVERAGE